# Patient Record
Sex: FEMALE
[De-identification: names, ages, dates, MRNs, and addresses within clinical notes are randomized per-mention and may not be internally consistent; named-entity substitution may affect disease eponyms.]

---

## 2017-04-08 NOTE — C.PDOC
History Of Present Illness


The patient, a 60 y/o female whose PMHx includes kidney problems and 

hypercholesterolemia, presents to the ED for evaluation of generalized weakness 

which began around 1 week ago. As per family, patient recently arrived to the 

[place] after staying in [facility] for around 3 months. Patient has a history of 

renal insufficiency and states she does not have a PMD that she sees regularly. 

Otherwise, family members deny fever, chills, pain, nausea, vomiting, diarrhea 

on patient's behalf. 


Time Seen by Provider: 04/08/17 11:04


Chief Complaint (Nursing): Medical Clearance


History Per: Family


History/Exam Limitations: no limitations


Onset/Duration Of Symptoms: Days


Current Symptoms Are (Timing): Still Present


Recent travel outside of the [place]: Yes


Additional History Per: Family





Past Medical History


Reviewed: Historical Data, Nursing Documentation, Vital Signs


Vital Signs: 


 Last Vital Signs











Temp  97.8 F   04/08/17 12:19


 


Pulse  62   04/08/17 12:19


 


Resp  18   04/08/17 12:19


 


BP  145/82   04/08/17 12:19


 


Pulse Ox  96   04/08/17 12:34














- Medical History


PMH: Gastritis, Hypercholesterolemia, Kidney Stones


Surgical History: No Surg Hx


Family History: States: Unknown Family Hx





- Social History


Hx Alcohol Use: No


Hx Substance Use: No





- Immunization History


Hx Tetanus Toxoid Vaccination: No


Hx Influenza Vaccination: No


Hx Pneumococcal Vaccination: No





Review Of Systems


Except As Marked, All Systems Reviewed And Found Negative.


Constitutional: Positive for: Weakness.  Negative for: Fever, Chills


Gastrointestinal: Negative for: Nausea, Vomiting, Abdominal Pain, Diarrhea


Musculoskeletal: Negative for: Back Pain





Physical Exam





- Physical Exam


Appears: Non-toxic, No Acute Distress


Skin: Normal Color, Warm, Dry


Head: Atraumatic, Normacephalic


Eye(s): bilateral: Normal Inspection, EOMI


Oral Mucosa: Moist


Neck: Normal ROM, Supple


Chest: Symmetrical, No Deformity, No Tenderness


Cardiovascular: Rhythm Regular, No Murmur


Respiratory: Normal Breath Sounds, No Rales, No Rhonchi, No Wheezing


Gastrointestinal/Abdominal: Soft, No Tenderness, No Guarding, No Rebound


Back: Normal Inspection, No Vertebral Tenderness, No Paraspinal Tenderness


Extremity: Normal ROM, Capillary Refill (less than 2 seconds)


Neurological/Psych: Oriented x3, Normal Speech, Normal Cognition


Gait: Steady





ED Course And Treatment





- Laboratory Results


Result Diagrams: 


 04/08/17 11:32





 04/08/17 11:32


O2 Sat by Pulse Oximetry: 96 (on RA)


Pulse Ox Interpretation: Normal





Medical Decision Making


Medical Decision Making: 


Impression: 60 y/o female with generalized weakness 


Plan:


* labs 


* IV fluids 


* reassess and disposition 





Progress Notes: 


labs ordered and reviewed showing renal insufficiency


Patient received IV Fluids. 


Patient remained well and in no acute distress. Discuss results with patient 

and family and provide copy of results. Advise family to make appointment in 

[facility] clinic and also provide number for  service. 





Disposition


Counseled Patient/Family Regarding: Need For Followup





- Disposition


Referrals: 


 at [facility] [Outside]


 Service [Outside]


Disposition: HOME/ ROUTINE


Disposition Time: 11:59


Condition: STABLE


Additional Instructions: 


Follow up with the clinic in 2-5 days for further evaluation. Return to the 

emergency department at any time if symptoms persist or worsen. You may call 

 service for any assistance [phone].


Instructions:  Chronic Kidney Disease (ED)





- POA


Present On Arrival: None





- Clinical Impression


Clinical Impression: 


 Chronic renal insufficiency





- PA / NP / Resident Statement


MD/DO has reviewed & agrees with the documentation as recorded.





- Scribe Statement


The provider has reviewed the documentation as recorded by the Scribe (Karen Reyes)





All medical record entries made by the Scribe were at my direction and 

personally dictated by me. I have reviewed the chart and agree that the record 

accurately reflects my personal performance of the history, physical exam, 

medical decision making, and the department course for this patient. I have 

also personally directed, reviewed, and agree with the discharge instructions 

and disposition.

## 2017-10-28 NOTE — C.PDOC
History Of Present Illness


61 y/o female presents to ED for incidental evaluation of "little bit of kidney 

problem". Pt states that she was diagnosed with "kidney problem" 1 year ago. 

Notes that she missed her clinic appointment last month, and would like to "get 

everything checked" here today. Pt complains of "gas", states that she has been 

eating and drinking fine. Denies chest pain, shortness of breath, nausea, 

vomiting, or fever.





Time Seen by Provider: 10/28/17 16:34


Chief Complaint (Nursing): Med Refill


History Per: Patient


History/Exam Limitations: no limitations


Onset/Duration Of Symptoms: Days


Current Symptoms Are (Timing): Still Present


Recent travel outside of the [place]: No


Additional History Per: Patient





Past Medical History


Reviewed: Historical Data, Nursing Documentation, Vital Signs


Vital Signs: 


 Last Vital Signs











Temp  98 F   10/28/17 18:51


 


Pulse  72   10/28/17 18:51


 


Resp  18   10/28/17 18:51


 


BP  122/80   10/28/17 18:51


 


Pulse Ox  100   10/28/17 18:51














- Medical History


PMH: Gastritis, Hypercholesterolemia, Kidney Stones


Family History: States: Unknown Family Hx





- Social History


Hx Alcohol Use: No


Hx Substance Use: No





- Immunization History


Hx Tetanus Toxoid Vaccination: No


Hx Influenza Vaccination: No


Hx Pneumococcal Vaccination: No





Review Of Systems


Except As Marked, All Systems Reviewed And Found Negative.


Constitutional: Negative for: Fever, Chills


Cardiovascular: Negative for: Chest Pain, Palpitations


Respiratory: Negative for: Cough, Shortness of Breath


Gastrointestinal: Negative for: Nausea, Vomiting, Diarrhea


Genitourinary: Negative for: Dysuria, Frequency, Hematuria


Neurological: Negative for: Headache, Dizziness





Physical Exam





- Physical Exam


Appears: Non-toxic, No Acute Distress


Skin: Normal Color, Warm, Dry


Head: Atraumatic, Normacephalic


Eye(s): bilateral: Normal Inspection


Oral Mucosa: Moist


Neck: Supple


Chest: Symmetrical


Cardiovascular: Rhythm Regular, No Murmur


Respiratory: Normal Breath Sounds, No Rales, No Rhonchi, No Wheezing


Gastrointestinal/Abdominal: Soft, No Tenderness


Back: Normal Inspection, No CVA Tenderness


Extremity: Normal ROM, No Pedal Edema, No Deformity


Neurological/Psych: Oriented x3, Normal Speech





ED Course And Treatment


O2 Sat by Pulse Oximetry: 100 (RA)


Pulse Ox Interpretation: Normal





Disposition


Counseled Patient/Family Regarding: Diagnosis, Need For Followup, Rx Given





- Disposition


Referrals: 


[facility] [Outside]


[facility] [Outside]


Disposition: HOME/ ROUTINE


Disposition Time: 18:27


Condition: GOOD


Prescriptions: 


Simethicone [Gas-X] 125 mg PO Q6 #20 capsule


Instructions:  Gas and Bloating (ED)


Forms:  [facility] (English)





- Clinical Impression


Clinical Impression: 


 Gassiness








- Scribe Statement


The provider has reviewed the documentation as recorded by the Scriblaurie Reyes





All medical record entries made by the Vitoibe were at my direction and 

personally dictated by me. I have reviewed the chart and agree that the record 

accurately reflects my personal performance of the history, physical exam, 

medical decision making, and the department course for this patient. I have 

also personally directed, reviewed, and agree with the discharge instructions 

and disposition.

## 2017-10-28 NOTE — C.PDOC
Time Seen by Provider: 10/28/17 16:34


Chief Complaint (Nursing): Med Refill





Past Medical History


Vital Signs: 





 Last Vital Signs











Temp  98.1 F   10/28/17 15:51


 


Pulse  78   10/28/17 15:51


 


Resp  20   10/28/17 15:51


 


BP  127/81   10/28/17 15:51


 


Pulse Ox  100   10/28/17 15:51














- Medical History


PMH: Gastritis, Hypercholesterolemia, Kidney Stones


Family History: States: Unknown Family Hx





- Social History


Hx Alcohol Use: No


Hx Substance Use: No





- Immunization History


Hx Tetanus Toxoid Vaccination: No


Hx Influenza Vaccination: No


Hx Pneumococcal Vaccination: No





ED Course And Treatment


O2 Sat by Pulse Oximetry: 100





Disposition


Counseled Patient/Family Regarding: Diagnosis, Need For Followup, Rx Given





- Disposition


Referrals: 


 Service [Outside]


[facility] [Outside]


Disposition: HOME/ ROUTINE


Disposition Time: 18:27


Condition: GOOD


Prescriptions: 


Simethicone [Gas-X] 125 mg PO Q6 #20 capsule


Instructions:  Gas and Bloating (ED)





- Clinical Impression


Clinical Impression: 


 Gassiness

## 2019-03-13 ENCOUNTER — HOSPITAL ENCOUNTER (EMERGENCY)
Dept: HOSPITAL 31 - C.ER | Age: 64
Discharge: HOME | End: 2019-03-13
Payer: COMMERCIAL

## 2019-03-13 VITALS
DIASTOLIC BLOOD PRESSURE: 82 MMHG | HEART RATE: 91 BPM | SYSTOLIC BLOOD PRESSURE: 129 MMHG | TEMPERATURE: 98.4 F | RESPIRATION RATE: 18 BRPM

## 2019-03-13 VITALS — OXYGEN SATURATION: 95 %

## 2019-03-13 VITALS — BODY MASS INDEX: 29.7 KG/M2

## 2019-03-13 DIAGNOSIS — N64.4: Primary | ICD-10-CM

## 2019-03-13 NOTE — C.PDOC
History Of Present Illness


62 y/o female presents to the ER complaining of right breast pain which began 

yesterday. Patient is also complaining itching to the right breast for the past 

2 weeks. Patient states that she had a mammogram in November 2018. Afterwards , 

daughter of patient states that she went to [facility]. In [facility], she had 

breast sonogram which showed cystic structure at approximately the 12 o clock 

area. Daughter notes that patient has history of cystic structure which was 

removed years ago. Denies having nipple discharge, rash, CP,SOB, fever, and 

chills. 


Time Seen by Provider: 03/13/19 11:22


Chief Complaint (Nursing): Breast Problem


History Per: Patient, Family (daughter)


History/Exam Limitations: no limitations


Onset/Duration Of Symptoms: Days


Current Symptoms Are (Timing): Still Present


Severity: Moderate





Past Medical History


Reviewed: Historical Data, Nursing Documentation, Vital Signs


Vital Signs: 





                                Last Vital Signs











Temp  98.5 F   03/13/19 10:19


 


Pulse  95 H  03/13/19 10:19


 


Resp  16   03/13/19 10:19


 


BP  136/80   03/13/19 10:19


 


Pulse Ox  95   03/13/19 10:19














- Medical History


PMH: Gastritis, HTN, Hypercholesterolemia, Kidney Stones


Other Surgeries: Hx of surgeries


Family History: States: No Known Family Hx





- Social History


Hx Alcohol Use: No


Hx Substance Use: No





- Immunization History


Hx Tetanus Toxoid Vaccination: No


Hx Influenza Vaccination: No


Hx Pneumococcal Vaccination: No





Review Of Systems


Except As Marked, All Systems Reviewed And Found Negative.


Constitutional: Negative for: Fever, Chills


Cardiovascular: Negative for: Chest Pain


Respiratory: Negative for: Shortness of Breath


Musculoskeletal: Positive for: Other (right breast pain)





Physical Exam





- Physical Exam


Appears: Non-toxic, No Acute Distress


Skin: Normal Color, Warm, Dry


Head: Atraumatic, Normacephalic


Eye(s): bilateral: Normal Inspection


Nose: Normal


Oral Mucosa: Moist


Neck: Supple


Chest: Symmetrical, Other (post op scar below right nipple, no nipple discharge,

no skin changes, no lumps)


Cardiovascular: Rhythm Regular


Respiratory: Normal Breath Sounds, No Rales, No Rhonchi, No Wheezing


Neurological/Psych: Oriented x3, Normal Speech





ED Course And Treatment


O2 Sat by Pulse Oximetry: 95 (RA)


Pulse Ox Interpretation: Normal


Progress Note: Patient treated with Motrin PO and Tylenol PO.





Disposition





- Disposition


Referrals: 


[facility] [Outside]


Disposition: HOME/ ROUTINE


Disposition Time: 11:33


Condition: STABLE


Additional Instructions: 


Follow up with Clinic/Breast specialist within 1-2 days. Return to ED if feel 

worse.


Prescriptions: 


Ibuprofen [Motrin Tab] 400 mg PO Q8 #30 tab


Instructions:  Breast Care for the Non-breast Feeding Woman (ED)


Forms:  [facility] (English)





- Clinical Impression


Clinical Impression: 


 Pain of breast








- PA / NP / Resident Statement


MD/DO has reviewed & agrees with the documentation as recorded.





- Scribe Statement


The provider has reviewed the documentation as recorded by the Fredi Jessica





Provider Attestation





All medical record entries made by the Vitoibe were at my direction and 

personally dictated by me. I have reviewed the chart and agree that the record 

accurately reflects my personal performance of the history, physical exam, 

medical decision making, and the department course for this patient. I have also

personally directed, reviewed, and agree with the discharge instructions and 

disposition.

## 2019-03-27 ENCOUNTER — HOSPITAL ENCOUNTER (OUTPATIENT)
Dept: HOSPITAL 31 - C.MAMMO | Age: 64
End: 2019-03-27
Payer: COMMERCIAL